# Patient Record
Sex: MALE | Race: WHITE
[De-identification: names, ages, dates, MRNs, and addresses within clinical notes are randomized per-mention and may not be internally consistent; named-entity substitution may affect disease eponyms.]

---

## 2018-08-07 NOTE — RAD
THREE VIEWS CERVICAL SPINE:

 

History: Cervical spondylosis. 

 

FINDINGS: 

AP, lateral, and open mouth odontoid views of the cervical spine obtained. 

 

Images demonstrate ACDF with anterior fusion plates fusing the C4-5 levels. The C3-4, C5, C6, and C7 
levels appear to have old fusion changes. 

 

There is loss of the normal lordotic curvature of the cervical spine. 

 

There is a large anterior bridging osteophyte across the anterior aspect of the C2-3 vertebral disc l
evel. 

 

IMPRESSION: 

ACDF C4-5 plate and screws in place. There is extensive old cervical spine fusion noted. No acute cer
vical spine abnormality is seen. No evidence of loosening or fracture seen on the recently placed ACD
F hardware. 

 

POS: [facility]

## 2018-09-25 NOTE — RAD
CERVICAL SPINE 4 VIEWS:

 

Date:  09/25/18 

 

HISTORY:  

Follow-up post neck surgery August 2018. 

 

FINDINGS:

 

Comparison made with exam of 08/07/18. 

 

Postop changes of anterior spinal fusion with plate and screws at C4-5 level with intradiscal prosthe
ses are again seen in good position and alignment. 

 

The C3-4, C5-6, and C6-7 levels appear to have old fusion changes which are again seen. The large ant
erior bridging osteophyte across the anterior aspect of C2-3 vertebral disc level is again noted. The
re is loss of cervical lordosis with straightening of the cervical spine. 

 

IMPRESSION: 

Stable postop changes. 

 

 

POS: [facility]

## 2022-07-07 ENCOUNTER — HOSPITAL ENCOUNTER (OUTPATIENT)
Dept: HOSPITAL 92 - BICRAD | Age: 72
Discharge: HOME | End: 2022-07-07
Attending: NURSE PRACTITIONER
Payer: MEDICARE

## 2022-07-07 DIAGNOSIS — M47.816: Primary | ICD-10-CM

## 2022-07-07 DIAGNOSIS — M25.78: ICD-10-CM

## 2022-07-07 PROCEDURE — 72110 X-RAY EXAM L-2 SPINE 4/>VWS: CPT

## 2022-11-22 ENCOUNTER — HOSPITAL ENCOUNTER (OUTPATIENT)
Dept: HOSPITAL 92 - CSHRAD | Age: 72
Discharge: HOME | End: 2022-11-22
Attending: NURSE PRACTITIONER
Payer: MEDICARE

## 2022-11-22 VITALS — TEMPERATURE: 98 F | SYSTOLIC BLOOD PRESSURE: 141 MMHG | DIASTOLIC BLOOD PRESSURE: 67 MMHG

## 2022-11-22 VITALS — BODY MASS INDEX: 21 KG/M2

## 2022-11-22 DIAGNOSIS — M47.814: ICD-10-CM

## 2022-11-22 DIAGNOSIS — M51.17: ICD-10-CM

## 2022-11-22 DIAGNOSIS — Z98.890: ICD-10-CM

## 2022-11-22 DIAGNOSIS — I10: ICD-10-CM

## 2022-11-22 DIAGNOSIS — E11.9: ICD-10-CM

## 2022-11-22 DIAGNOSIS — Z91.040: ICD-10-CM

## 2022-11-22 DIAGNOSIS — M47.812: ICD-10-CM

## 2022-11-22 DIAGNOSIS — M51.16: ICD-10-CM

## 2022-11-22 DIAGNOSIS — G47.30: ICD-10-CM

## 2022-11-22 DIAGNOSIS — Z90.09: ICD-10-CM

## 2022-11-22 DIAGNOSIS — Z88.6: ICD-10-CM

## 2022-11-22 DIAGNOSIS — M48.10: ICD-10-CM

## 2022-11-22 DIAGNOSIS — Z88.0: ICD-10-CM

## 2022-11-22 DIAGNOSIS — Z79.899: ICD-10-CM

## 2022-11-22 DIAGNOSIS — Z90.49: ICD-10-CM

## 2022-11-22 DIAGNOSIS — M47.816: Primary | ICD-10-CM

## 2022-11-22 DIAGNOSIS — M96.1: ICD-10-CM

## 2022-11-22 DIAGNOSIS — M47.817: ICD-10-CM

## 2022-11-22 DIAGNOSIS — M53.3: ICD-10-CM

## 2022-11-22 PROCEDURE — 62304 MYELOGRAPHY LUMBAR INJECTION: CPT

## 2022-11-22 PROCEDURE — 72132 CT LUMBAR SPINE W/DYE: CPT
